# Patient Record
Sex: FEMALE | Race: OTHER | Employment: UNEMPLOYED | URBAN - METROPOLITAN AREA
[De-identification: names, ages, dates, MRNs, and addresses within clinical notes are randomized per-mention and may not be internally consistent; named-entity substitution may affect disease eponyms.]

---

## 2017-06-26 LAB
ANTIBODY SCREEN, EXTERNAL: NEGATIVE
CHLAMYDIA, EXTERNAL: NEGATIVE
HBSAG, EXTERNAL: NEGATIVE
HCT, EXTERNAL: 37
HGB, EXTERNAL: 12.2
HIV, EXTERNAL: NEGATIVE
N. GONORRHEA, EXTERNAL: NEGATIVE
RPR, EXTERNAL: NORMAL
RUBELLA, EXTERNAL: NORMAL
TYPE, ABO & RH, EXTERNAL: NORMAL
URINALYSIS, EXTERNAL: NEGATIVE

## 2018-01-04 LAB — GRBS, EXTERNAL: NEGATIVE

## 2018-01-15 ENCOUNTER — HOSPITAL ENCOUNTER (INPATIENT)
Age: 38
LOS: 2 days | Discharge: HOME | End: 2018-01-17
Attending: OBSTETRICS & GYNECOLOGY | Admitting: OBSTETRICS & GYNECOLOGY
Payer: OTHER GOVERNMENT

## 2018-01-15 PROBLEM — Z34.90 PREGNANCY: Status: ACTIVE | Noted: 2018-01-15

## 2018-01-15 LAB
BASOPHILS # BLD: 0 K/UL (ref 0–0.1)
BASOPHILS NFR BLD: 0 % (ref 0–1)
DAILY QC (YES/NO)?: YES
EOSINOPHIL # BLD: 0.1 K/UL (ref 0–0.4)
EOSINOPHIL NFR BLD: 1 % (ref 0–7)
ERYTHROCYTE [DISTWIDTH] IN BLOOD BY AUTOMATED COUNT: 13.3 % (ref 11.5–14.5)
HCT VFR BLD AUTO: 34.3 % (ref 35–47)
HGB BLD-MCNC: 11.8 G/DL (ref 11.5–16)
LYMPHOCYTES # BLD: 1.9 K/UL (ref 0.8–3.5)
LYMPHOCYTES NFR BLD: 18 % (ref 12–49)
MCH RBC QN AUTO: 31.1 PG (ref 26–34)
MCHC RBC AUTO-ENTMCNC: 34.4 G/DL (ref 30–36.5)
MCV RBC AUTO: 90.3 FL (ref 80–99)
MONOCYTES # BLD: 0.8 K/UL (ref 0–1)
MONOCYTES NFR BLD: 7 % (ref 5–13)
NEUTS SEG # BLD: 7.6 K/UL (ref 1.8–8)
NEUTS SEG NFR BLD: 74 % (ref 32–75)
PH, VAGINAL FLUID: 7 (ref 5–6.1)
PLATELET # BLD AUTO: 118 K/UL (ref 150–400)
RBC # BLD AUTO: 3.8 M/UL (ref 3.8–5.2)
WBC # BLD AUTO: 10.3 K/UL (ref 3.6–11)

## 2018-01-15 PROCEDURE — 99218 HC RM OBSERVATION: CPT

## 2018-01-15 PROCEDURE — 74011250636 HC RX REV CODE- 250/636: Performed by: OBSTETRICS & GYNECOLOGY

## 2018-01-15 PROCEDURE — 77030018846 HC SOL IRR STRL H20 ICUM -A

## 2018-01-15 PROCEDURE — 75410000003 HC RECOV DEL/VAG/CSECN EA 0.5 HR: Performed by: OBSTETRICS & GYNECOLOGY

## 2018-01-15 PROCEDURE — 99285 EMERGENCY DEPT VISIT HI MDM: CPT

## 2018-01-15 PROCEDURE — 83986 ASSAY PH BODY FLUID NOS: CPT | Performed by: OBSTETRICS & GYNECOLOGY

## 2018-01-15 PROCEDURE — 36415 COLL VENOUS BLD VENIPUNCTURE: CPT | Performed by: OBSTETRICS & GYNECOLOGY

## 2018-01-15 PROCEDURE — 65270000029 HC RM PRIVATE

## 2018-01-15 PROCEDURE — 85025 COMPLETE CBC W/AUTO DIFF WBC: CPT | Performed by: OBSTETRICS & GYNECOLOGY

## 2018-01-15 PROCEDURE — 74011250637 HC RX REV CODE- 250/637: Performed by: OBSTETRICS & GYNECOLOGY

## 2018-01-15 PROCEDURE — 75410000002 HC LABOR FEE PER 1 HR: Performed by: OBSTETRICS & GYNECOLOGY

## 2018-01-15 PROCEDURE — 75410000000 HC DELIVERY VAGINAL/SINGLE: Performed by: OBSTETRICS & GYNECOLOGY

## 2018-01-15 RX ORDER — SODIUM CHLORIDE 0.9 % (FLUSH) 0.9 %
5-10 SYRINGE (ML) INJECTION EVERY 8 HOURS
Status: DISCONTINUED | OUTPATIENT
Start: 2018-01-15 | End: 2018-01-17

## 2018-01-15 RX ORDER — OXYTOCIN IN 5 % DEXTROSE 30/500 ML
.5-2 PLASTIC BAG, INJECTION (ML) INTRAVENOUS
Status: DISCONTINUED | OUTPATIENT
Start: 2018-01-15 | End: 2018-01-17

## 2018-01-15 RX ORDER — SODIUM CHLORIDE, SODIUM LACTATE, POTASSIUM CHLORIDE, CALCIUM CHLORIDE 600; 310; 30; 20 MG/100ML; MG/100ML; MG/100ML; MG/100ML
125 INJECTION, SOLUTION INTRAVENOUS CONTINUOUS
Status: DISCONTINUED | OUTPATIENT
Start: 2018-01-15 | End: 2018-01-17

## 2018-01-15 RX ORDER — SODIUM CHLORIDE 0.9 % (FLUSH) 0.9 %
5-10 SYRINGE (ML) INJECTION AS NEEDED
Status: DISCONTINUED | OUTPATIENT
Start: 2018-01-15 | End: 2018-01-17 | Stop reason: HOSPADM

## 2018-01-15 RX ORDER — DIPHENHYDRAMINE HCL 25 MG
25 CAPSULE ORAL
Status: DISCONTINUED | OUTPATIENT
Start: 2018-01-15 | End: 2018-01-17 | Stop reason: HOSPADM

## 2018-01-15 RX ORDER — IBUPROFEN 600 MG/1
600 TABLET ORAL
Status: DISCONTINUED | OUTPATIENT
Start: 2018-01-15 | End: 2018-01-17 | Stop reason: HOSPADM

## 2018-01-15 RX ORDER — METHYLERGONOVINE MALEATE 0.2 MG/ML
0.2 INJECTION INTRAVENOUS ONCE
Status: COMPLETED | OUTPATIENT
Start: 2018-01-15 | End: 2018-01-15

## 2018-01-15 RX ORDER — NALOXONE HYDROCHLORIDE 0.4 MG/ML
0.4 INJECTION, SOLUTION INTRAMUSCULAR; INTRAVENOUS; SUBCUTANEOUS AS NEEDED
Status: DISCONTINUED | OUTPATIENT
Start: 2018-01-15 | End: 2018-01-17 | Stop reason: HOSPADM

## 2018-01-15 RX ORDER — SIMETHICONE 80 MG
80 TABLET,CHEWABLE ORAL
Status: DISCONTINUED | OUTPATIENT
Start: 2018-01-15 | End: 2018-01-17 | Stop reason: HOSPADM

## 2018-01-15 RX ORDER — BISACODYL 5 MG
5 TABLET, DELAYED RELEASE (ENTERIC COATED) ORAL DAILY PRN
Status: DISCONTINUED | OUTPATIENT
Start: 2018-01-15 | End: 2018-01-17 | Stop reason: HOSPADM

## 2018-01-15 RX ORDER — ZOLPIDEM TARTRATE 5 MG/1
5 TABLET ORAL
Status: DISCONTINUED | OUTPATIENT
Start: 2018-01-15 | End: 2018-01-17 | Stop reason: HOSPADM

## 2018-01-15 RX ORDER — ONDANSETRON 4 MG/1
4 TABLET, ORALLY DISINTEGRATING ORAL
Status: DISCONTINUED | OUTPATIENT
Start: 2018-01-15 | End: 2018-01-17 | Stop reason: HOSPADM

## 2018-01-15 RX ORDER — HYDROCORTISONE ACETATE PRAMOXINE HCL 2.5; 1 G/100G; G/100G
CREAM TOPICAL AS NEEDED
Status: DISCONTINUED | OUTPATIENT
Start: 2018-01-15 | End: 2018-01-17 | Stop reason: HOSPADM

## 2018-01-15 RX ORDER — OXYCODONE AND ACETAMINOPHEN 5; 325 MG/1; MG/1
1 TABLET ORAL
Status: DISCONTINUED | OUTPATIENT
Start: 2018-01-15 | End: 2018-01-17 | Stop reason: HOSPADM

## 2018-01-15 RX ORDER — OXYTOCIN/RINGER'S LACTATE 20/1000 ML
125-500 PLASTIC BAG, INJECTION (ML) INTRAVENOUS ONCE
Status: ACTIVE | OUTPATIENT
Start: 2018-01-15 | End: 2018-01-16

## 2018-01-15 RX ADMIN — IBUPROFEN 600 MG: 600 TABLET, FILM COATED ORAL at 22:31

## 2018-01-15 RX ADMIN — Medication 2 MILLI-UNITS/MIN: at 19:56

## 2018-01-15 RX ADMIN — Medication 2 MILLI-UNITS/MIN: at 14:16

## 2018-01-15 RX ADMIN — METHYLERGONOVINE MALEATE 0.2 MG: 0.2 INJECTION, SOLUTION INTRAMUSCULAR; INTRAVENOUS at 20:22

## 2018-01-15 RX ADMIN — Medication 999 MILLI-UNITS/MIN: at 20:06

## 2018-01-15 RX ADMIN — SODIUM CHLORIDE, SODIUM LACTATE, POTASSIUM CHLORIDE, AND CALCIUM CHLORIDE 125 ML/HR: 600; 310; 30; 20 INJECTION, SOLUTION INTRAVENOUS at 12:58

## 2018-01-15 NOTE — PROGRESS NOTES
1119: placed on efm by charge nurse lydia gomez at [place] [id number] done and documented by charge nurse lydia syed. verbal report from charge nurse lydia gomez    [phone]: call to dr Eden Garcia, phone report on pt arriving, ntz+ by charge nurse, pt desires no pain management during labor. admit orders received. 1230: brp/vd    1310: dr Eden Garcia at bedside, strip reviewed, sve done, plan of care discussed with patient, rbvo received to start pitocin    1415: verbalizes understanding pitocin will increase the frequency and strength of contractions. Pitocin started. 1416: brp/vd    1610: c/o leaking more fluid, bedpad changed with each brp    1725: breathing with contractions, sitting up in bed Ramona style, repositioned bed to chair position for comfort. 1805: brp vd, pink fluid. bedpad change with each brp     1830: pitocin rate decreased. Now off    1842: sve done.  Charge nurse called and notified dr Eden Garcia    1900: increased urge to push, sve done    1927: prenatal labs verified with lydia marrero

## 2018-01-15 NOTE — IP AVS SNAPSHOT
[address] 
[phone] Patient: Chelo Toribio MRN: [id number] :1980 A check myra indicates which time of day the medication should be taken. My Medications START taking these medications Instructions Each Dose to Equal  
 Morning Noon Evening Bedtime  
 ibuprofen 600 mg tablet Commonly known as:  MOTRIN Your last dose was: Your next dose is: Take 1 Tab by mouth every six (6) hours as needed. 600 mg CONTINUE taking these medications Instructions Each Dose to Equal  
 Morning Noon Evening Bedtime PRENATAL PLUS (CALCIUM CARB) 27 mg iron- 1 mg Tab Generic drug:  prenatal vit-calcium-iron-fa Your last dose was: Your next dose is: Take 1 Tab by mouth daily. 1 Tab Where to Get Your Medications Information on where to get these meds will be given to you by the nurse or doctor. ! Ask your nurse or doctor about these medications  
  ibuprofen 600 mg tablet

## 2018-01-15 NOTE — IP AVS SNAPSHOT
Summary of Care Report The Summary of Care report has been created to help improve care coordination. Users with access to [employer] or [address] (Web-based application) may access additional patient information including the Discharge Summary. If you are not currently a [address] user and need more information, please call the number listed below in the [place] [address] section and ask to be connected with Medical Records. Facility Information Name Address Phone [address] [facility] [place] [address] [phone] Patient Information Patient Name Sex  Nataly Troncoso ([id number]) Female 1980 Discharge Information Admitting Provider Service Area Unit Hernandez Monterroso MD / [phone] [facility] 3 Mother Infant / [phone] Discharge Provider Discharge Date/Time Discharge Disposition Destination (none) 2018 (Pending) AHR (none) Patient Language Language ENGLISH [13] Hospital Problems as of 2018  Never Reviewed Class Noted - Resolved Last Modified POA Active Problems Full-term serafin ROM, onset labor within 24 hours of rupture  1/15/2018 - Present 1/15/2018 by Wendy Torres MD Unknown Entered by Wendy Torres MD  
  Pregnancy  1/15/2018 - Present 1/15/2018 by Hernandez Monterroso MD Unknown Entered by Hernandez Monterroso MD  
  
You are allergic to the following No active allergies Current Discharge Medication List  
  
START taking these medications Dose & Instructions Dispensing Information Comments  
 ibuprofen 600 mg tablet Commonly known as:  MOTRIN Dose:  600 mg Take 1 Tab by mouth every six (6) hours as needed. Quantity:  21 Tab Refills:  1 CONTINUE these medications which have NOT CHANGED Dose & Instructions Dispensing Information Comments PRENATAL PLUS (CALCIUM CARB) 27 mg iron- 1 mg Tab Generic drug:  prenatal vit-calcium-iron-fa Dose:  1 Tab Take 1 Tab by mouth daily. Refills:  0 Follow-up Information Follow up With Details Comments Contact Info None   None (395) Patient stated that they have no PCP Discharge Instructions Discharge Instructions for Vaginal Delivery Patient ID: 
Chelo Toribio [id number] 
44 y.o. 
1980 Take Home Medications Continue taking your prenatal vitamins if you are breastfeeding. Follow-up Appointment: 
Follow-up with vpfw in 6 weeks. Follow-up care is a key part of your treatment and safety. Be sure to make and go to all appointments, and call your doctor if you are having problems. Its also a good idea to know your test results and keep a list of the medicines you take. Activity Avoid anything in your vagina for 6 weeks (no intercourse, tampons, or douching). You may drive unless you are taking prescription pain medications. Climbing stairs and light lifting are ok after a vaginal delivery unless your doctor tells you not to. You may gradually work up to exercise over the next few weeks, but take it easy- you'll be tired! Diet You may eat a regular diet but you may want to avoid heavy, greasy foods and other foods that could increase constipation. Wound care If you have stitches, continue to rinse with a squirt bottle of warm water each time you use the bathroom for about 2 weeks. Your stitches will gradually dissolve over several weeks. Sitz baths are also helpful to keep the wound clean, encourage healing, and to help with pain associated with the stitches or hemorrhoids. You can use either a sitz bath basin or a bathtub filled with 2-3\" inches of plain warm water. Soak for 10 minutes 3 times a day. Pain Management If you were not given a prescription pain medication, you can take over the counter pain medicines like Tylenol (acetominophen), Advil or Motrin (ibuprofen). You can take acetominophen and ibuprofen together, alternating doses every few hours. You will get the most relief if you take the maximum dose: · Tylenol or acetominophen 1000 mg every 6 hours (equivalent to 2 Extra Strength Tylenols every 6 hours) · Motrin or Advil (generic ibuprofen) 800 mg every 8 hours (4 tablets or capsules every 8 hours) If you were given a prescription pain medication, you can take ibuprofen along with it (doses as above), but not Tylenol. Use ibuprofen as the main medication, and take the prescription medication if needed for more severe pain not relieved by the ibuprofen. Your goal should be to take only the minimum necessary amounts of the prescription medication (narcotic), as these pain medicines can be habit-forming and will worsen or cause constipation. Most patients will find that within a couple of days, their pain is adequately controlled using only over-the-counter medications. A heating pad can also be very helpful for cramping or back pain. Over-the-counter hemorrhoid wipes and ointments are fine to use if you have hemorrhoids. Constipation You may find that bowel movements are irregular after delivery and that you have a tendency to be constipated. If you have stitches (and especially if you had a more severe tear called a third- or fourth-degree), your bowel movements will be more comfortable if they are soft. A stool softener such as Colace (docusate) can safely be taken daily if needed. If you become constipated you can use a laxative such as Dulcolax, Miralax or Milk of Magnesia. Don't wait until constipation is severe- take something sooner rather than later and you will feel much better! Your Recovery: What to Expect at [facility] Delivering a baby is hard work and you probably aren't getting much sleep, so you will certainly be tired. Try to rest when you can and don't worry about doing housework or other tasks which can wait. If you're experiencing soreness or swelling in your bottom, this should improve over the next few days to 2 weeks. You are likely to have some back pain or general body aches or muscle soreness. This should improve with acetominophen or ibuprofen. If your legs were swollen during your pregnancy or as a result of IV fluids given during your hospital stay, this should go away in a few days to a week. Most women experience some form of the \"Baby Blues\" after having a baby. This means that you may feel emotional, tearful, frustrated, anxious, sad, and irritable some of the time. This is normal if it's not severe and should go away after about 2 weeks. Getting as much rest as you can will help. Accept assistance from friends and family members so that you can take breaks from caring for the baby. Call your doctor if your symptoms seem severe, last more than 2 weeks, or seem to be getting worse instead of better. Get help immediately if you have thoughts of wanting to hurt yourself or others! When should you call for help? Call 911 anytime you think you may need emergency care. For example, call if: You pass out (lose consciousness). You have sudden chest pain and shortness of breath, or you cough up blood. You have severe pain in your belly. Call your doctor now or seek immediate medical care if: 
You have heavy vaginal bleeding that soaks one or more pads in an hour, or you have large clots (golf ball size or larger). Your have foul-smelling discharge from your vagina. You are sick to your stomach or cannot keep fluids down. You have pain that does not get better after you take pain medicine. You have signs of infection, such as: Increased pain in your abdomen or vaginal area Red streaks, warmth, or tenderness of your breasts. A fever of 101 or greater (taken by mouth). You have signs of a blood clot, such as: 
Pain in your calf, back of knee, thigh, or groin. Redness and swelling in your leg or groin. You have trouble passing urine or stool, especially if you have pain or swelling in your lower belly; or if you are unable to have a bowel movement after taking a laxative. You have a fast or pounding heartbeat. Chart Review Routing History No Routing History on File

## 2018-01-15 NOTE — IP AVS SNAPSHOT
[address] 
[phone] Patient: Leny Holland MRN: [id number] :1980 About your hospitalization You were admitted on:  January 15, 2018 You last received care in the:  [facility] 3 MOTHER INFANT You were discharged on:  2018 Why you were hospitalized Your primary diagnosis was:  Not on File Your diagnoses also included:  Full-Term Myke Rom, Onset Labor Within 24 Hours Of Rupture, Pregnancy Follow-up Information Follow up With Details Comments Contact Info None   None (395) Patient stated that they have no PCP Discharge Orders None A check myra indicates which time of day the medication should be taken. My Medications START taking these medications Instructions Each Dose to Equal  
 Morning Noon Evening Bedtime  
 ibuprofen 600 mg tablet Commonly known as:  MOTRIN Your last dose was: Your next dose is: Take 1 Tab by mouth every six (6) hours as needed. 600 mg CONTINUE taking these medications Instructions Each Dose to Equal  
 Morning Noon Evening Bedtime PRENATAL PLUS (CALCIUM CARB) 27 mg iron- 1 mg Tab Generic drug:  prenatal vit-calcium-iron-fa Your last dose was: Your next dose is: Take 1 Tab by mouth daily. 1 Tab Where to Get Your Medications Information on where to get these meds will be given to you by the nurse or doctor. ! Ask your nurse or doctor about these medications  
  ibuprofen 600 mg tablet Discharge Instructions Discharge Instructions for Vaginal Delivery Patient ID: 
Leny Holland [id number] 
44 y.o. 
1980 Take Home Medications Continue taking your prenatal vitamins if you are breastfeeding. Follow-up Appointment: 
Follow-up with vpfw in 6 weeks. Follow-up care is a key part of your treatment and safety. Be sure to make and go to all appointments, and call your doctor if you are having problems. Its also a good idea to know your test results and keep a list of the medicines you take. Activity Avoid anything in your vagina for 6 weeks (no intercourse, tampons, or douching). You may drive unless you are taking prescription pain medications. Climbing stairs and light lifting are ok after a vaginal delivery unless your doctor tells you not to. You may gradually work up to exercise over the next few weeks, but take it easy- you'll be tired! Diet You may eat a regular diet but you may want to avoid heavy, greasy foods and other foods that could increase constipation. Wound care If you have stitches, continue to rinse with a squirt bottle of warm water each time you use the bathroom for about 2 weeks. Your stitches will gradually dissolve over several weeks. Sitz baths are also helpful to keep the wound clean, encourage healing, and to help with pain associated with the stitches or hemorrhoids. You can use either a sitz bath basin or a bathtub filled with 2-3\" inches of plain warm water. Soak for 10 minutes 3 times a day. Pain Management If you were not given a prescription pain medication, you can take over the counter pain medicines like Tylenol (acetominophen), Advil or Motrin (ibuprofen). You can take acetominophen and ibuprofen together, alternating doses every few hours. You will get the most relief if you take the maximum dose: · Tylenol or acetominophen 1000 mg every 6 hours (equivalent to 2 Extra Strength Tylenols every 6 hours) · Motrin or Advil (generic ibuprofen) 800 mg every 8 hours (4 tablets or capsules every 8 hours) If you were given a prescription pain medication, you can take ibuprofen along with it (doses as above), but not Tylenol.   Use ibuprofen as the main medication, and take the prescription medication if needed for more severe pain not relieved by the ibuprofen. Your goal should be to take only the minimum necessary amounts of the prescription medication (narcotic), as these pain medicines can be habit-forming and will worsen or cause constipation. Most patients will find that within a couple of days, their pain is adequately controlled using only over-the-counter medications. A heating pad can also be very helpful for cramping or back pain. Over-the-counter hemorrhoid wipes and ointments are fine to use if you have hemorrhoids. Constipation You may find that bowel movements are irregular after delivery and that you have a tendency to be constipated. If you have stitches (and especially if you had a more severe tear called a third- or fourth-degree), your bowel movements will be more comfortable if they are soft. A stool softener such as Colace (docusate) can safely be taken daily if needed. If you become constipated you can use a laxative such as Dulcolax, Miralax or Milk of Magnesia. Don't wait until constipation is severe- take something sooner rather than later and you will feel much better! Your Recovery: What to Expect at [facility] Delivering a baby is hard work and you probably aren't getting much sleep, so you will certainly be tired. Try to rest when you can and don't worry about doing housework or other tasks which can wait. If you're experiencing soreness or swelling in your bottom, this should improve over the next few days to 2 weeks. You are likely to have some back pain or general body aches or muscle soreness. This should improve with acetominophen or ibuprofen. If your legs were swollen during your pregnancy or as a result of IV fluids given during your hospital stay, this should go away in a few days to a week. Most women experience some form of the \"Baby Blues\" after having a baby. This means that you may feel emotional, tearful, frustrated, anxious, sad, and irritable some of the time. This is normal if it's not severe and should go away after about 2 weeks. Getting as much rest as you can will help. Accept assistance from friends and family members so that you can take breaks from caring for the baby. Call your doctor if your symptoms seem severe, last more than 2 weeks, or seem to be getting worse instead of better. Get help immediately if you have thoughts of wanting to hurt yourself or others! When should you call for help? Call 911 anytime you think you may need emergency care. For example, call if: You pass out (lose consciousness). You have sudden chest pain and shortness of breath, or you cough up blood. You have severe pain in your belly. Call your doctor now or seek immediate medical care if: 
You have heavy vaginal bleeding that soaks one or more pads in an hour, or you have large clots (golf ball size or larger). Your have foul-smelling discharge from your vagina. You are sick to your stomach or cannot keep fluids down. You have pain that does not get better after you take pain medicine. You have signs of infection, such as: Increased pain in your abdomen or vaginal area Red streaks, warmth, or tenderness of your breasts. A fever of 101 or greater (taken by mouth). You have signs of a blood clot, such as: 
Pain in your calf, back of knee, thigh, or groin. Redness and swelling in your leg or groin. You have trouble passing urine or stool, especially if you have pain or swelling in your lower belly; or if you are unable to have a bowel movement after taking a laxative. You have a fast or pounding heartbeat. [employer] Announcement We are excited to announce that we are making your provider's discharge notes available to you in [employer].   You will see these notes when they are completed and signed by the physician that discharged you from your recent hospital stay. If you have any questions or concerns about any information you see in Bandhappy, please call the Health Information Department where you were seen or reach out to your Primary Care Provider for more information about your plan of care. Introducing [address]! Steven Sevilla introduces [employer]happy patient portal. Now you can access parts of your medical record, email your doctor's office, and request medication refills online. 1. In your internet browser, go to [email or web address] 2. Click on the First Time User? Click Here link in the Sign In box. You will see the New Member Sign Up page. 3. Enter your [employer]happy Access Code exactly as it appears below. You will not need to use this code after youve completed the sign-up process. If you do not sign up before the expiration date, you must request a new code. · [employer]happy Access Code: [email or web address] Expires: 4/17/2018  9:26 AM 
 
4. Enter the last four digits of your Social Security Number (xxxx) and Date of Birth (mm/dd/yyyy) as indicated and click Submit. You will be taken to the next sign-up page. 5. Create a Bandhappy ID. This will be your Bandhappy login ID and cannot be changed, so think of one that is secure and easy to remember. 6. Create a Bandhappy password. You can change your password at any time. 7. Enter your Password Reset Question and Answer. This can be used at a later time if you forget your password. 8. Enter your e-mail address. You will receive e-mail notification when new information is available in [address] 9. Click Sign Up. You can now view and download portions of your medical record. 10. Click the Download Summary menu link to download a portable copy of your medical information.  
 
If you have questions, please visit the Frequently Asked Questions section of thredUP. Remember, MyChart is NOT to be used for urgent needs. For medical emergencies, dial 911. Now available from your iPhone and Android! Providers Seen During Your Hospitalization Provider Specialty Primary office phone Mira Renae MD Obstetrics & Gynecology [phone] Your Primary Care Physician (PCP) Primary Care Physician Office Phone Office Fax NONE ** None ** ** None ** You are allergic to the following No active allergies Recent Documentation Breastfeeding? OB Status Smoking Status Unknown Recent pregnancy Never Smoker Emergency Contacts Name Discharge Info Relation Home Work Mobile Pranav Enriquez DISCHARGE CAREGIVER [3] Spouse [3] [phone] Patient Belongings The following personal items are in your possession at time of discharge: 
  Dental Appliances: None         Home Medications: None   Jewelry: None  Clothing:  (  )    Other Valuables: Cell Phone Please provide this summary of care documentation to your next provider. Signatures-by signing, you are acknowledging that this After Visit Summary has been reviewed with you and you have received a copy. Patient Signature:  ____________________________________________________________ Date:  ____________________________________________________________  
  
Aime Valerio Provider Signature:  ____________________________________________________________ Date:  ____________________________________________________________

## 2018-01-15 NOTE — PROGRESS NOTES
01/15/18 4:05 PM  CM met with patient to complete initial assessment and to begin discharge planning. Patient was present with her /FOB Maritza Valdez ([phone]) and their two daughters, ages 15 and 16. 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 FOB is in the [employer] and will have 10 days off from work. No family supports available locally, but co-workers and neighbors available to help as needed. Patient plans to breastfeed and is not interested in a breastpump at this time. The pediatric clinic at [facility] will provide medical follow up for the baby. Patient has car seat, crib, clothing, and other necessary supplies. Denied need for [facility] and Medicaid services. Care Management Interventions  PCP Verified by CM:  Yes (Dr. Sylwia Proctor at [facility])  Mode of Transport at Discharge: Self  Transition of Care Consult (CM Consult): Discharge Planning  Current Support Network: Lives with Spouse  Confirm Follow Up Transport: Family  Plan discussed with Pt/Family/Caregiver: Yes  Discharge Location  Discharge Placement: Home with outpatient services  MARGOT Pathak

## 2018-01-15 NOTE — H&P
History & Physical    Name: Nette Oshea MRN: [id number]  SSN: [id number]    YOB: 1980  Age: 44 y.o. Sex: female        Subjective:     Estimated Date of Delivery: 18  OB History      Para Term  AB Living    1         SAB TAB Ectopic Molar Multiple Live Births                   Ms. Nicol Jimenez is admitted with pregnancy at 38w0d for srom. Prenatal course was normal. Please see prenatal records for details. No past medical history on file. No past surgical history on file. Social History     Occupational History    Not on file. Social History Main Topics    Smoking status: Not on file    Smokeless tobacco: Not on file    Alcohol use Not on file    Drug use: Not on file    Sexual activity: Not on file     No family history on file. No Known Allergies  Prior to Admission medications    Medication Sig Start Date End Date Taking? Authorizing Provider   prenatal vit-calcium-iron-fa (PRENATAL PLUS, CALCIUM CARB,) 27 mg iron- 1 mg tab Take 1 Tab by mouth daily. Yes Historical Provider        Review of Systems: A comprehensive review of systems was negative except for that written in the HPI. Objective:     Vitals:  Vitals:    01/15/18 1214 01/15/18 1219 01/15/18 1224 01/15/18 1256   BP:       Pulse:       SpO2: 99% 98% 99% 100%        Physical Exam:  Patient without distress. Abdomen: soft, nontender  Fundus: soft and non tender  Perineum: blood absent, amniotic fluid present  Cervical Exam: 2 cm dilated    30% effaced    -2 station    Presenting Part: cephalic  Cervical Position: posterior  Consistency: Firm  Lower Extremities:  - Edema 1+  Membranes:  Spontaneous Rupture of Membranes;  Amniotic Fluid: clear fluid  Fetal Heart Rate: Reactive    Prenatal Labs:   No results found for: RUBELLAEXT, GRBSEXT, HBSAGEXT, HIVEXT, RPREXT, GONNOEXT, CHLAMEXT     Assessment/Plan:     Plan: Admit for Reassuring fetal status, Continue plan for vaginal delivery, Pitocin augmentation of labor. .  Group B Strep was negative.     Signed By:  Sherlyn Givens MD     January 15, 2018

## 2018-01-15 NOTE — PROGRESS NOTES
Pt arrived with family to L&D c/o leaking of clear fluid since 9am this morning. Pt placed on fetal monitor at this time.

## 2018-01-16 PROCEDURE — 74011250637 HC RX REV CODE- 250/637: Performed by: OBSTETRICS & GYNECOLOGY

## 2018-01-16 PROCEDURE — 65270000029 HC RM PRIVATE

## 2018-01-16 RX ADMIN — IBUPROFEN 600 MG: 600 TABLET, FILM COATED ORAL at 04:15

## 2018-01-16 RX ADMIN — IBUPROFEN 600 MG: 600 TABLET, FILM COATED ORAL at 19:01

## 2018-01-16 RX ADMIN — IBUPROFEN 600 MG: 600 TABLET, FILM COATED ORAL at 11:19

## 2018-01-16 NOTE — LACTATION NOTE
Problem: Lactation Care Plan  Goal: *Infant latching appropriately  Outcome: Progressing Towards Goal  Baby at the breast on right. Latched, lips flanged, intermittent suckling note.     Pt will successfully establish breastfeeding by feeding in response to early feeding cues   or wake every 3h, will obtain deep latch, and will keep log of feedings/output. Taught to BF at hunger cues and or q 2-3 hrs and to offer 10-20 drops of hand expressed colostrum at any non-feeds.       Breast Assessment  Left Breast: Medium  Left Nipple: Everted, Intact  Right Breast: Medium  Right Nipple: Everted, Intact  Breast- Feeding Assessment  Attends Breast-Feeding Classes: No  Breast-Feeding Experience: Yes (8-9 months with first)  Breast Trauma/Surgery: No  Type/Quality: Good  Lactation Consultant Visits  Breast-Feedings: Good   Mother/Infant Observation  Mother Observation: Alignment, Breast comfortable, Close hold, Holds breast, Lets baby end feeding, Nipple round on release  Infant Observation: Audible swallows, Breast tissue moves, Latches nipple and aereolae, Lips flanged, lower, Lips flanged, upper, Opens mouth, Rhythmic suck  LATCH Documentation  Latch: Grasps breast, tongue down, lips flanged, rhythmic sucking  Audible Swallowing: Spontaneous and intermittent (24 hours old)  Type of Nipple: Everted (after stimulation)  Comfort (Breast/Nipple): Soft/non-tender  Hold (Positioning): No assist from staff, mother able to position/hold infant  LATCH Score: 10        Goal: *Weight loss less than 10% of birth weight  Outcome: Progressing Towards Goal     Encouraged mom to attempt feeding with baby led feeding cues. Just as sucking on fingers, rooting, mouthing. Looking for 8-12 feedings in 24 hours. Don't limit baby at breast, allow baby to come of breast on it's own. Baby may want to feed  often and may increase number of feedings on second day of life.  Skin to skin encouraged.       If baby doesn't nurse,  Mom should  hand express  10-20 drops of colostrum and drip into baby's mouth, or give to baby by finger feeding, cup feeding, or spoon feeding at least every 2-3 hours.      Discussed eating a healthy diet. Instructed mother to eat a variety of foods in order to get a well balanced diet. She should consume an extra 300-500 calories per day (more than her non-pregnant requirement.) These extra calories will help provide energy needed for optimal breast milk production. Mother also encouraged to \"drink to thirst\" and it is recommended that she drink fluids such as water and fruit/vegetable juice. Nutritious snacks should be available so that she can eat throughout the day to help satisfy her hunger and maintain a good milk supply. Continue taking your prenatal vitamins as long as you breast feed.         Problem: Patient Education: Go to Patient Education Activity  Goal: Patient/Family Education  Outcome: Progressing Towards Goal  Reviewed breastfeeding basics:  Supply and demand,  stomach size, early  Feeding cues, skin to skin, positioning and baby led latch-on, assymetrical latch with signs of good, deep latch vs shallow, feeding frequency and duration, and log sheet for tracking infant feedings and output. Breastfeeding Booklet and Warm line information given.   Discussed typical  weight loss and the importance of infant weight checks with pediatrician 1-2 post discharge.     Mom comfortable and relaxed with breast feeding

## 2018-01-16 NOTE — ROUTINE PROCESS
Bedside and Verbal shift change report given to Corby Epstein RN (oncoming nurse) by Aditya Cardoza RN (offgoing nurse). Report included the following information SBAR, Intake/Output, MAR, Accordion and Recent Results.

## 2018-01-16 NOTE — DISCHARGE SUMMARY
Patient ID:  Gurmeet Montalvo  [id number]  89 y.o.  1980    Admit Date: 1/15/2018    Discharge Date: 18     Admitting Physician: Kaylee Fairchild MD    Attending Physician: Eulalio Garza MD    Admission Diagnoses: Pregnancy  Full-term serafin ROM, onset labor within 24 hours of rupture  Pregnancy    Procedures for this admission:     Discharge Diagnoses: Same as above with vaginally producing a viable infant. Information for the patient's :  Kush Dupree, Female [[id number]]   One Minute Apgar: 5 (Filed from Delivery Summary)  Five  Minute Apgar: 9 (Filed from Delivery Summary)        Discharge Disposition:  home    Discharge Condition:  stable    Additional Diagnoses: srom, pitocin . Maternal Labs:   Lab Results   Component Value Date/Time    HBsAg, External negative 2017    HIV, External negative 2017    Rubella, External immune 2017    RPR, External non-reactive 2017    GrBStrep, External negative 2018       Cord Blood Results:   Information for the patient's :  Kush Dupree, Female [[id number]]   No results found for: PCTABR, ABORH, PCTDIG, BILI, ABORH, ABORHEXT          History of Present Illness:   OB History      Para Term  AB Living    3 2    1    SAB TAB Ectopic Molar Multiple Live Births         1        Admitted for SROM. Hospital Course:   Patient was admitted as above and delivered via vagina . Please the chart for details. The postpartum course was unremarkable. She was deemed stable for discharge home on day 2.     Follow-up Care: 6 w        Signed:  Kaylee Fairchild MD  1/15/2018  8:18 PM

## 2018-01-16 NOTE — OP NOTES
[facility] 78  OPERATIVE REPORT    Hector Patel  MR#: [id number]  : 1980  ACCOUNT #: [de-identified]   DATE OF SERVICE: 01/15/2018        PROCEDURE:  Normal spontaneous vaginal delivery. SURGEON:  Padmini De eJsus MD        At 20:05 hours, there was a normal spontaneous vaginal delivery of a live female infant over an intact perineum. Baby was delivered easily. Cord was double clamped and cut after a minute. IV Pitocin was running. Placenta was delivered spontaneously. Uterus was massaged for contractility. She continued to be a little bit boggy, so she received one dose of Methergine IM. Pelvic and rectal examination was found to be intact. ESTIMATED BLOOD LOSS:  Less than 500 mL. COMPLICATIONS:  None. FETAL HEART RATE:  Category 1.       Lou Danielson MD       PS / DN  D: 01/15/2018 20:20     T: 01/15/2018 21:23  JOB #: [id number]

## 2018-01-16 NOTE — PROGRESS NOTES
7:12 PM Report from Lorna Khan    7:18 PM Dr. Duc Huff at bedside for eval. Attempt to reduce anterior lip at this time with MD.    7:26 PM unable to reduce anterior lip with MD at this time. Patient returned to seated frog leg position, breathing through contractions. Desires no epidural    7:57 PM VO from Dr. Duc Huff to restart pitocin at this time    10:45 PM TRANSFER - OUT REPORT:    Verbal report given to Ashlee BYRD(name) on Julio Mora  being transferred to MIU(unit) for routine progression of care       Report consisted of patients Situation, Background, Assessment and   Recommendations(SBAR). Information from the following report(s) SBAR, Kardex, Procedure Summary, Intake/Output and Recent Results was reviewed with the receiving nurse. Lines:   Peripheral IV 01/15/18 Left Forearm (Active)   Site Assessment Clean, dry, & intact 1/15/2018  8:32 PM   Phlebitis Assessment 0 1/15/2018  8:32 PM   Infiltration Assessment 0 1/15/2018  8:32 PM   Dressing Status Clean, dry, & intact 1/15/2018  8:32 PM   Dressing Type Transparent;Tape 1/15/2018  8:32 PM   Hub Color/Line Status Pink; Infusing 1/15/2018  8:32 PM   Action Taken Blood drawn 1/15/2018 12:48 PM   Alcohol Cap Used Yes 1/15/2018  8:32 PM        Opportunity for questions and clarification was provided.       Patient transported with:   Registered Nurse

## 2018-01-16 NOTE — L&D DELIVERY NOTE
Delivery Summary    Patient: Kellen Ward MRN: [id number]  SSN: [id number]    YOB: 1980  Age: 44 y.o. Sex: female        Labor Events:    Labor: No    Rupture Date: 1/15/2018    Rupture Time: 9:00 AM    Rupture Type SROM    Amniotic Fluid Volume:      Amniotic Fluid Description: Clear  None    Induction: None        Augmentation: Oxytocin    Labor Complications: None     Additional Complications:        Cervical Ripening:       None      Delivery Events:  Episiotomy: None    Laceration(s): None      Repaired: None     Number of Repair Packets:      Suture Type and Size:         Estimated Blood Loss (ml):          Information for the patient's newbornBrittny Castrejon, Female [[id number]]     Delivery Summary - Baby    Delivery Date: 1/15/2018   Delivery Time: 8:05 PM   Delivery Type: Vaginal, Spontaneous Delivery  Sex:  female  Gestational Age: 38w0d  Delivery Clinician:  Calista Remy  Living?: Living   Delivery Location: &D 203           APGARS  One minute Five minutes Ten minutes   Skin Color: 1    1       Heart Rate: 2   2         Reflex Irritability: 2   2         Muscle Tone: 2   2       Respiration: 2   2         Total: 9   9           Presentation: Vertex  Position: Left Occiput Anterior  Resuscitation Method:  Suctioning-bulb; Tactile Stimulation     Meconium Stained: None    Cord Information: 3 Vessels   Complications: None  Cord Blood Sent?:  No    Blood Gases Sent?:  No    Placenta:  Date/Time: 1/15  8:09 PM  Removal: Spontaneous      Appearance: Normal      Measurements:  Birth Weight:      Birth Length:     Head Circumference:       Chest Circumference:      Abdominal Girth:       Other Providers:   PATRICA Segura Obstetrician;Primary Nurse;Primary [employer] Nurse           Cord Blood Results:  Information for the patient's :  Celi Deucecynthia, Female [[id number]]   No results found for: ABORH, PCTABR, PCTDIG, BILI, ABORHEXT, [address]    Information for the patient's :  Vanessa Cruz, Female [[id number]]   No results found for: APH, APCO2, APO2, AHCO3, ABEC, ABDC, O2ST, SITE, [place], PHI, [place], PO2I, HCO3I, SO2I, IBD     Information for the patient's :  Vanessa Cruz, Female [[id number]]   No results found for: EPHV, PCO2V, PO2V, HCO3V, O2STV, EBDV

## 2018-01-16 NOTE — PROGRESS NOTES
Labor Progress Note  Patient seen, fetal heart rate and contraction pattern evaluated at 1916    Physical Exam:  Cervical Exam was examined   8 cm dilated    80% effaced    0 station    Presenting Part: cephalic  Cervical Position: mid position  Consistency: Soft    Membranes:  Intact  Uterine Activity[de-identified] Frequency: Every 3 - 4 minutes  Fetal Heart Rate: Reactive    Assessment/Plan:    Reassuring fetal status, Continue plan for vaginal delivery, Attempted to reduce the cervix as she is pushing involuntarily, unable to do so. Will allow to labor  Reassuring fetal status. Continue current managent.   Sangeeta Hernandez MD  1/15/2018  7:35 PM

## 2018-01-16 NOTE — PROGRESS NOTES
Post-Partum Day Number 1 Progress Note    René Peguero       Information for the patient's :  Winifred Miller, Female [[id number]]   Vaginal, Spontaneous Delivery   Patient doing well without significant complaint. Voiding without difficulty, normal lochia. Vitals:  Visit Vitals    BP 99/62 (BP 1 Location: Left arm, BP Patient Position: At rest)    Pulse 62    Temp 98.2 °F (36.8 °C)    Resp 16    SpO2 94%    Breastfeeding Unknown     Temp (24hrs), Av.5 °F (36.9 °C), Min:97.9 °F (36.6 °C), Max:99.1 °F (37.3 °C)        Exam:   Patient without distress. FF @ U-2 NT                LE NT w/o edema    Labs:     Lab Results   Component Value Date/Time    WBC 10.3 01/15/2018 01:15 PM    HGB 11.8 01/15/2018 01:15 PM    HCT 34.3 01/15/2018 01:15 PM    PLATELET 335  01:15 PM    Hgb, External 12.2 2017    Hct, External 37 2017       Recent Results (from the past 24 hour(s))   PH BY NITRAZINE, POC    Collection Time: 01/15/18 11:03 AM   Result Value Ref Range    pH-Nitrazine paper 7.0 (A) 5.0 - 6.1    Daily QC performed? Yes    CBC WITH AUTOMATED DIFF    Collection Time: 01/15/18  1:15 PM   Result Value Ref Range    WBC 10.3 3.6 - 11.0 K/uL    RBC 3.80 3.80 - 5.20 M/uL    HGB 11.8 11.5 - 16.0 g/dL    HCT 34.3 (L) 35.0 - 47.0 %    MCV 90.3 80.0 - 99.0 FL    MCH 31.1 26.0 - 34.0 PG    MCHC 34.4 30.0 - 36.5 g/dL    RDW 13.3 11.5 - 14.5 %    PLATELET 460 (L) 466 - 400 K/uL    NEUTROPHILS 74 32 - 75 %    LYMPHOCYTES 18 12 - 49 %    MONOCYTES 7 5 - 13 %    EOSINOPHILS 1 0 - 7 %    BASOPHILS 0 0 - 1 %    ABS. NEUTROPHILS 7.6 1.8 - 8.0 K/UL    ABS. LYMPHOCYTES 1.9 0.8 - 3.5 K/UL    ABS. MONOCYTES 0.8 0.0 - 1.0 K/UL    ABS. EOSINOPHILS 0.1 0.0 - 0.4 K/UL    ABS. BASOPHILS 0.0 0.0 - 0.1 K/UL         Assessment: PPD 1 s/p , stable    Plan:  1.  Continue routine postpartum care

## 2018-01-17 VITALS
OXYGEN SATURATION: 94 % | TEMPERATURE: 97.8 F | DIASTOLIC BLOOD PRESSURE: 56 MMHG | RESPIRATION RATE: 14 BRPM | SYSTOLIC BLOOD PRESSURE: 100 MMHG | HEART RATE: 62 BPM

## 2018-01-17 PROCEDURE — 74011250637 HC RX REV CODE- 250/637: Performed by: OBSTETRICS & GYNECOLOGY

## 2018-01-17 RX ORDER — IBUPROFEN 600 MG/1
600 TABLET ORAL
Qty: 21 TAB | Refills: 1 | Status: SHIPPED | OUTPATIENT
Start: 2018-01-17

## 2018-01-17 RX ADMIN — MUPIROCIN: 20 OINTMENT TOPICAL at 11:38

## 2018-01-17 RX ADMIN — IBUPROFEN 600 MG: 600 TABLET, FILM COATED ORAL at 01:22

## 2018-01-17 RX ADMIN — IBUPROFEN 600 MG: 600 TABLET, FILM COATED ORAL at 07:09

## 2018-01-17 NOTE — PROGRESS NOTES
.Pt off unit in stable condition via wheelchair with volunteers for discharge home per Dr. Lyons Hence. Pt is to follow-up in 6 weeks and is aware. Prescriptions given to pt. Pt denies any HA, Dizziness, N/V or pain at this time. Infant in car seat with mother.

## 2018-01-17 NOTE — PROGRESS NOTES
Post-Partum Day Number 2 Progress/Discharge Note    Patient doing well post-partum without significant complaint. Vitals:  Patient Vitals for the past 8 hrs:   BP Temp Pulse Resp   18 0400 100/56 97.8 °F (36.6 °C) 62 14     Temp (24hrs), Av.1 °F (36.7 °C), Min:97.8 °F (36.6 °C), Max:98.3 °F (36.8 °C)      Vital signs stable, afebrile. Exam:  Patient without distress. Abdomen soft, fundus firm at level of umbilicus, non tender                              Lower extremities are negative for swelling, cords or tenderness. Lab/Data Review: All lab results for the last 24 hours reviewed. Assessment and Plan:  Patient appears to be having uncomplicated post-partum course. Continue routine perineal care and maternal education. Plan discharge for today with follow up in our office in 6 weeks.

## 2018-01-17 NOTE — LACTATION NOTE
This note was copied from a baby's chart. Mother c/o nipple pain. Compression striped noted bilaterally. Talked with mother regarding deep versus shallow latching and positioning. Mothers questions answered. Discharge and engorgement reviewed. Chart shows numerous feedings, void, stool WNL. Discussed importance of monitoring outputs and feedings on first week of life. Discussed ways to tell if baby is  getting enough breast milk, ie  voids and stools, change in color of stool, and return to birth wt within 2 weeks. Follow up with pediatrician visit for weight check in 1-2 days (per AAP guidelines.)  Encouraged to call Warm Line  [phone] or [facility] at [phone] for any questions/problems that arise. Mother also given breastfeeding support group dates and times for any future needs    Engorgement Care Guidelines:  Reviewed how milk is made and normal phases of milk production. Taught care of engorged breasts - frequent breastfeeding encouraged, cool packs and motrin as tolerated. Anticipatory guidance shared. Pt will successfully establish breastfeeding by feeding in response to early feeding cues   or wake every 3h, will obtain deep latch, and will keep log of feedings/output. Taught to BF at hunger cues and or q 2-3 hrs and to offer 10-20 drops of hand expressed colostrum at any non-feeds.       Breast Assessment  Left Breast: Medium  Left Nipple: Everted, Compression stripe  Right Breast: Medium  Right Nipple: Compression stripe, Everted  Breast- Feeding Assessment  Attends Breast-Feeding Classes: No  Breast-Feeding Experience: Yes (8-9 months with first)  Breast Trauma/Surgery: No  Type/Quality: Good  Lactation Consultant Visits  Breast-Feedings: Good   Mother/Infant Observation  Mother Observation: Recognizes feeding cues  Infant Observation: Feeding cues

## 2018-01-17 NOTE — ROUTINE PROCESS
Bedside and Verbal shift change report given to PRISCA Ott RN (oncoming nurse) by Gabe Hood RN (offgoing nurse). Report included the following information SBAR, Intake/Output, MAR, Accordion and Recent Results.

## 2018-01-17 NOTE — DISCHARGE INSTRUCTIONS
Discharge Instructions for Vaginal Delivery    Patient ID:  Gilford Fess  [id number]  44 y.o.  1980    Take Home Medications           Continue taking your prenatal vitamins if you are breastfeeding. Follow-up Appointment:  Follow-up with vpf[facility] in 6 weeks. Follow-up care is a key part of your treatment and safety. Be sure to make and go to all appointments, and call your doctor if you are having problems. Its also a good idea to know your test results and keep a list of the medicines you take. Activity  Avoid anything in your vagina for 6 weeks (no intercourse, tampons, or douching). You may drive unless you are taking prescription pain medications. Climbing stairs and light lifting are ok after a vaginal delivery unless your doctor tells you not to. You may gradually work up to exercise over the next few weeks, but take it easy- you'll be tired! Diet  You may eat a regular diet but you may want to avoid heavy, greasy foods and other foods that could increase constipation. Wound care  If you have stitches, continue to rinse with a squirt bottle of warm water each time you use the bathroom for about 2 weeks. Your stitches will gradually dissolve over several weeks. Sitz baths are also helpful to keep the wound clean, encourage healing, and to help with pain associated with the stitches or hemorrhoids. You can use either a sitz bath basin or a bathtub filled with 2-3\" inches of plain warm water. Soak for 10 minutes 3 times a day. Pain Management  If you were not given a prescription pain medication, you can take over the counter pain medicines like Tylenol (acetominophen), Advil or Motrin (ibuprofen). You can take acetominophen and ibuprofen together, alternating doses every few hours.   You will get the most relief if you take the maximum dose:  · Tylenol or acetominophen 1000 mg every 6 hours (equivalent to 2 Extra Strength Tylenols every 6 hours)  · Motrin or Advil (generic ibuprofen) 800 mg every 8 hours (4 tablets or capsules every 8 hours)  If you were given a prescription pain medication, you can take ibuprofen along with it (doses as above), but not Tylenol. Use ibuprofen as the main medication, and take the prescription medication if needed for more severe pain not relieved by the ibuprofen. Your goal should be to take only the minimum necessary amounts of the prescription medication (narcotic), as these pain medicines can be habit-forming and will worsen or cause constipation. Most patients will find that within a couple of days, their pain is adequately controlled using only over-the-counter medications. A heating pad can also be very helpful for cramping or back pain. Over-the-counter hemorrhoid wipes and ointments are fine to use if you have hemorrhoids. Constipation  You may find that bowel movements are irregular after delivery and that you have a tendency to be constipated. If you have stitches (and especially if you had a more severe tear called a third- or fourth-degree), your bowel movements will be more comfortable if they are soft. A stool softener such as Colace (docusate) can safely be taken daily if needed. If you become constipated you can use a laxative such as Dulcolax, Miralax or Milk of Magnesia. Don't wait until constipation is severe- take something sooner rather than later and you will feel much better! Your Recovery: What to Expect at Home  Delivering a baby is hard work and you probably aren't getting much sleep, so you will certainly be tired. Try to rest when you can and don't worry about doing housework or other tasks which can wait. If you're experiencing soreness or swelling in your bottom, this should improve over the next few days to 2 weeks. You are likely to have some back pain or general body aches or muscle soreness. This should improve with acetominophen or ibuprofen.   If your legs were swollen during your pregnancy or as a result of IV fluids given during your hospital stay, this should go away in a few days to a week. Most women experience some form of the \"Baby Blues\" after having a baby. This means that you may feel emotional, tearful, frustrated, anxious, sad, and irritable some of the time. This is normal if it's not severe and should go away after about 2 weeks. Getting as much rest as you can will help. Accept assistance from friends and family members so that you can take breaks from caring for the baby. Call your doctor if your symptoms seem severe, last more than 2 weeks, or seem to be getting worse instead of better. Get help immediately if you have thoughts of wanting to hurt yourself or others! When should you call for help? Call 911 anytime you think you may need emergency care. For example, call if:  You pass out (lose consciousness). You have sudden chest pain and shortness of breath, or you cough up blood. You have severe pain in your belly. Call your doctor now or seek immediate medical care if:  You have heavy vaginal bleeding that soaks one or more pads in an hour, or you have large clots (golf ball size or larger). Your have foul-smelling discharge from your vagina. You are sick to your stomach or cannot keep fluids down. You have pain that does not get better after you take pain medicine. You have signs of infection, such as: Increased pain in your abdomen or vaginal area  Red streaks, warmth, or tenderness of your breasts. A fever of 101 or greater (taken by mouth). You have signs of a blood clot, such as:  Pain in your calf, back of knee, thigh, or groin. Redness and swelling in your leg or groin. You have trouble passing urine or stool, especially if you have pain or swelling in your lower belly; or if you are unable to have a bowel movement after taking a laxative. You have a fast or pounding heartbeat.

## 2018-01-17 NOTE — PROGRESS NOTES
Bedside and Verbal shift change report given to Jill Phillips rn (oncoming nurse) by KATHY Mccoy (offgoing nurse). Report given with SBAR, Kardex, Intake/Output and MAR.